# Patient Record
Sex: MALE | Race: WHITE
[De-identification: names, ages, dates, MRNs, and addresses within clinical notes are randomized per-mention and may not be internally consistent; named-entity substitution may affect disease eponyms.]

---

## 2020-01-26 ENCOUNTER — HOSPITAL ENCOUNTER (EMERGENCY)
Dept: HOSPITAL 56 - MW.ED | Age: 40
Discharge: HOME | End: 2020-01-26
Payer: SELF-PAY

## 2020-01-26 DIAGNOSIS — F17.210: ICD-10-CM

## 2020-01-26 DIAGNOSIS — R19.5: Primary | ICD-10-CM

## 2020-01-26 DIAGNOSIS — Z91.040: ICD-10-CM

## 2020-01-26 LAB
BUN SERPL-MCNC: 10 MG/DL (ref 7–18)
CHLORIDE SERPL-SCNC: 104 MMOL/L (ref 98–107)
CO2 SERPL-SCNC: 26 MMOL/L (ref 21–32)
GLUCOSE SERPL-MCNC: 88 MG/DL (ref 74–106)
LIPASE SERPL-CCNC: 95 U/L (ref 73–393)
POTASSIUM SERPL-SCNC: 3.8 MMOL/L (ref 3.5–5.1)
SODIUM SERPL-SCNC: 139 MMOL/L (ref 136–148)

## 2020-01-26 NOTE — EDM.PDOC
ED HPI GENERAL MEDICAL PROBLEM





- General


Chief Complaint: Gastrointestinal Problem


Stated Complaint: STOMACK PAIN


Time Seen by Provider: 01/26/20 17:57


Source of Information: Reports: Patient


History Limitations: Reports: No Limitations





- History of Present Illness


INITIAL COMMENTS - FREE TEXT/NARRATIVE: 


HISTORY AND PHYSICAL:





History of present illness:


Patient is a 39-year-old male who presents to the ED today with concern of 

blood in his stools over the past 2 weeks and abdominal pain that is been off 

and on.  Patient states he is not currently having abdominal pain but is 

concerned because he has been having blood in his stools.  Patient states the 

blood is darker in color.  Patient states he had blood in his stool today so he 

came to be evaluated in the ED.  Patient denies any health history or any other 

symptoms or concerns.





Patient denies fever, chills, chest pain, shortness of breath, or cough. Denies 

headache, neck stiff ness, change in vision, syncope, or near syncope. Denies 

nausea, vomiting, abdominal pain, diarrhea, constipation, or dysuria. Has not 

noted any blood in urine. Patient has been eating and drinking appropriately.





Review of systems: 


As per history of present illness and below otherwise all systems reviewed and 

negative.





Past medical history: 


As per history of present illness and as reviewed below otherwise 

noncontributory.





Surgical history: 


As per history of present illness and as reviewed below otherwise 

noncontributory.





Social history: 


See social history for further information





Family history: 


As per history of present illness and as reviewed below otherwise 

noncontributory.





Physical exam:


General: Patient is alert, oriented, and in no acute distress. Patient sitting 

comfortably on exam table.


HEENT: Atraumatic, normocephalic, pupils equal and reactive bilaterally, 

negative for conjunctival pallor or scleral icterus, mucous membranes moist, 

TMs normal bilaterally, throat clear, neck supple, nontender, trachea midline. 

No drooling or trismus noted. No meningeal signs. No hot potato voice noted. 


Lungs: Clear to auscultation, breath sounds equal bilaterally, chest nontender.


Heart: S1S2, regular rate and rhythm without overt murmur


Abdomen: Soft, nondistended, nontender. Negative for masses or 

hepatosplenomegaly. Negative for costovertebral tenderness.


Pelvis: Stable nontender.


Genitourinary: Deferred.


Rectal: Chaperone at bedside KF. Tone intact. No hemorrhoids, rashes, lesions 

noted. Hemoccult negative


Skin: Intact, warm, dry. No lesions or rashes noted.


Extremities: Atraumatic, negative for cords or calf pain. Neurovascular 

unremarkable.


Neuro: Awake, alert, oriented. Cranial nerves II through XII unremarkable. 

Cerebellum unremarkable. Motor and sensory unremarkable throughout. Exam 

nonfocal.





Notes:


Hemeoccult negative.  Discussed importance for follow-up with a general 

surgeon. Patient placed on follow up list with General surgery.


Voices understanding and is agreeable to plan of care. Denies any further 

questions or concerns at this time.





Diagnostics:


Hemeoccult. CBC, CMP, UA, lipase





Therapeutics:


None





Prescription:


Omeprazole





Impression: 


H/O  blood in stool





Plan:


1.  Take medication as prescribed.


2.  Follow-up with the general surgeon as well as a primary care provider as 

discussed.  The number has been provided above for you to call and establish an 

appointment time.


3.  Return to the ED as needed and as discussed.





Definitive disposition and diagnosis as appropriate pending reevaluation and 

review of above.





  ** Abdomen


Pain Score (Numeric/FACES): 8





- Related Data


 Allergies











Allergy/AdvReac Type Severity Reaction Status Date / Time


 


latex Allergy  Redness Verified 01/26/20 17:25











Home Meds: 


 Home Meds





. [No Known Home Meds]  01/26/20 [History]











Past Medical History





- Infectious Disease History


Infectious Disease History: Reports: Chicken Pox





- Past Surgical History


HEENT Surgical History: Reports: Myringotomy w Tube(s)


GI Surgical History: Reports: Colonoscopy





Social & Family History





- Family History


Family Medical History: Noncontributory





- Tobacco Use


Smoking Status *Q: Current Every Day Smoker


Years of Tobacco use: 15


Packs/Tins Daily: 0.5





- Caffeine Use


Caffeine Use: Reports: Coffee, Soda





- Recreational Drug Use


Recreational Drug Use: No





ED ROS GENERAL





- Review of Systems


Review Of Systems: Comprehensive ROS is negative, except as noted in HPI.





ED EXAM, GENERAL





- Physical Exam


Exam: See Below (see dictation)





Course





- Vital Signs


Last Recorded V/S: 


 Last Vital Signs











Temp  98.1 F   01/26/20 17:25


 


Pulse  92   01/26/20 17:25


 


Resp  18   01/26/20 17:25


 


BP  139/85   01/26/20 17:25


 


Pulse Ox  93 L  01/26/20 17:25














- Orders/Labs/Meds


Orders: 


 Active Orders 24 hr











 Category Date Time Status


 


 Hemoccult [Fecal Occult Blood Collection] [RC] Care  01/26/20 17:58 Active





 ASDIRECTED   











Labs: 


 Laboratory Tests











  01/26/20 01/26/20 01/26/20 Range/Units





  18:15 18:15 18:21 


 


WBC  9.00    (4.0-11.0)  K/uL


 


RBC  5.39    (4.50-5.90)  M/uL


 


Hgb  14.9    (13.0-17.0)  g/dL


 


Hct  44.2    (38.0-50.0)  %


 


MCV  82.0    (80.0-98.0)  fL


 


MCH  27.6    (27.0-32.0)  pg


 


MCHC  33.7    (31.0-37.0)  g/dL


 


RDW Std Deviation  42.2    (28.0-62.0)  fl


 


RDW Coeff of Beth  14    (11.0-15.0)  %


 


Plt Count  249    (150-400)  K/uL


 


MPV  8.60    (7.40-12.00)  fL


 


Neut % (Auto)  58.2    (48.0-80.0)  %


 


Lymph % (Auto)  28.9    (16.0-40.0)  %


 


Mono % (Auto)  10.6    (0.0-15.0)  %


 


Eos % (Auto)  2.1    (0.0-7.0)  %


 


Baso % (Auto)  0.2    (0.0-1.5)  %


 


Neut # (Auto)  5.2    (1.4-5.7)  K/uL


 


Lymph # (Auto)  2.6 H    (0.6-2.4)  K/uL


 


Mono # (Auto)  1.0 H    (0.0-0.8)  K/uL


 


Eos # (Auto)  0.2    (0.0-0.7)  K/uL


 


Baso # (Auto)  0.0    (0.0-0.1)  K/uL


 


Nucleated RBC %  0.0    /100WBC


 


Nucleated RBCs #  0    K/uL


 


Sodium   139   (136-148)  mmol/L


 


Potassium   3.8   (3.5-5.1)  mmol/L


 


Chloride   104   ()  mmol/L


 


Carbon Dioxide   26.0   (21.0-32.0)  mmol/L


 


BUN   10   (7.0-18.0)  mg/dL


 


Creatinine   1.1   (0.8-1.3)  mg/dL


 


Est Cr Clr Drug Dosing   103.91   mL/min


 


Estimated GFR (MDRD)   > 60.0   ml/min


 


Glucose   88   ()  mg/dL


 


Calcium   8.6   (8.5-10.1)  mg/dL


 


Total Bilirubin   0.4   (0.2-1.0)  mg/dL


 


AST   14 L   (15-37)  IU/L


 


ALT   25   (14-63)  IU/L


 


Alkaline Phosphatase   23 L   ()  U/L


 


Total Protein   7.2   (6.4-8.2)  g/dL


 


Albumin   3.5   (3.4-5.0)  g/dL


 


Globulin   3.7   (2.6-4.0)  g/dL


 


Albumin/Globulin Ratio   0.9   (0.9-1.6)  


 


Lipase   95   ()  U/L


 


Urine Color    YELLOW  


 


Urine Appearance    CLEAR  


 


Urine pH    6.0  (5.0-8.0)  


 


Ur Specific Gravity    1.015  (1.001-1.035)  


 


Urine Protein    NEGATIVE  (NEGATIVE)  mg/dL


 


Urine Glucose (UA)    NEGATIVE  (NEGATIVE)  mg/dL


 


Urine Ketones    NEGATIVE  (NEGATIVE)  mg/dL


 


Urine Occult Blood    NEGATIVE  (NEGATIVE)  


 


Urine Nitrite    NEGATIVE  (NEGATIVE)  


 


Urine Bilirubin    NEGATIVE  (NEGATIVE)  


 


Urine Urobilinogen    0.2  (<2.0)  EU/dL


 


Ur Leukocyte Esterase    NEGATIVE  (NEGATIVE)  














Departure





- Departure


Time of Disposition: 19:00


Disposition: Home, Self-Care 01


Clinical Impression: 


 History of bloody stools








- Discharge Information


Referrals: 


PCP,None [Primary Care Provider] - 


Forms:  ED Department Discharge


Additional Instructions: 


The following information is given to patients seen in the emergency department 

who are being discharged to home. This information is to outline your options 

for follow-up care. We provide all patients seen in our emergency department 

with a follow-up referral.





The need for follow-up, as well as the timing and circumstances, are variable 

depending upon the specifics of your emergency department visit.





If you don't have a primary care physician on staff, we will provide you with a 

referral. We always advise you to contact your personal physician following an 

emergency department visit to inform them of the circumstance of the visit and 

for follow-up with them and/or the need for any referrals to a consulting 

specialist.





The emergency department will also refer you to a specialist when appropriate. 

This referral assures that you have the opportunity for follow-up care with a 

specialist. All of these measure are taken in an effort to provide you with 

optimal care, which includes your follow-up.





Under all circumstances we always encourage you to contact your private 

physician who remains a resource for coordinating your care. When calling for 

follow-up care, please make the office aware that this follow-up is from your 

recent emergency room visit. If for any reason you are refused follow-up, 

please contact the [facility] Emergency 

Department at [phone] and asked to speak to the emergency department 

charge nurse.





[facility]


Primary Care


[address] [place] [address]


Phone: [phone]


Fax: [phone]





[address] [address]


Phone: [phone]


Fax: [phone]





[facility] - General Surgery


[address]


[address] [place] [address], [address] 


[place] [address]


Phone: [phone]


Fax: [phone]








1.  Take medication as prescribed.


2.  Follow-up with the general surgeon as well as a primary care provider as 

discussed.  The number has been provided above for you to call and establish an 

appointment time.


3.  Return to the ED as needed and as discussed.





 











Sepsis Event Note





- Evaluation


Sepsis Screening Result: No Definite Risk





- Focused Exam


Vital Signs: 


 Vital Signs











  Temp Pulse Resp BP Pulse Ox


 


 01/26/20 17:25  98.1 F  92  18  139/85  93 L











Date Exam was Performed: 01/26/20


Time Exam was Performed: 18:57





- My Orders


Last 24 Hours: 


My Active Orders





01/26/20 17:58


Hemoccult [Fecal Occult Blood Collection] [] ASDIRECTED 














- Assessment/Plan


Last 24 Hours: 


My Active Orders





01/26/20 17:58


Hemoccult [Fecal Occult Blood Collection] [RC] ASDIRECTED

## 2020-02-19 ENCOUNTER — HOSPITAL ENCOUNTER (EMERGENCY)
Dept: HOSPITAL 56 - MW.ED | Age: 40
Discharge: HOME | End: 2020-02-19
Payer: SELF-PAY

## 2020-02-19 DIAGNOSIS — J40: Primary | ICD-10-CM

## 2020-02-19 DIAGNOSIS — F17.210: ICD-10-CM

## 2020-02-19 DIAGNOSIS — Z91.040: ICD-10-CM

## 2020-02-19 LAB
BUN SERPL-MCNC: 9 MG/DL (ref 7–18)
CHLORIDE SERPL-SCNC: 102 MMOL/L (ref 98–107)
CO2 SERPL-SCNC: 26.7 MMOL/L (ref 21–32)
GLUCOSE SERPL-MCNC: 93 MG/DL (ref 74–106)
POTASSIUM SERPL-SCNC: 3.7 MMOL/L (ref 3.5–5.1)
SODIUM SERPL-SCNC: 138 MMOL/L (ref 136–148)

## 2020-02-19 PROCEDURE — 84484 ASSAY OF TROPONIN QUANT: CPT

## 2020-02-19 PROCEDURE — 93005 ELECTROCARDIOGRAM TRACING: CPT

## 2020-02-19 PROCEDURE — 85379 FIBRIN DEGRADATION QUANT: CPT

## 2020-02-19 PROCEDURE — 80053 COMPREHEN METABOLIC PANEL: CPT

## 2020-02-19 PROCEDURE — 85025 COMPLETE CBC W/AUTO DIFF WBC: CPT

## 2020-02-19 PROCEDURE — 71046 X-RAY EXAM CHEST 2 VIEWS: CPT

## 2020-02-19 PROCEDURE — 99285 EMERGENCY DEPT VISIT HI MDM: CPT

## 2020-02-19 PROCEDURE — 36415 COLL VENOUS BLD VENIPUNCTURE: CPT

## 2020-02-19 NOTE — EDM.PDOC
ED [facility] GENERAL MEDICAL PROBLEM





- General


Chief Complaint: General


Stated Complaint: TROUBLE BREATHING, DIZZY


Time Seen by Provider: 02/19/20 08:56


Source of Information: Reports: Patient


History Limitations: Reports: No Limitations





- History of Present Illness


INITIAL COMMENTS - FREE TEXT/NARRATIVE: 


Patient is a 39-year-old male with no known past medical history presenting 

with a chief complaint of shortness of breath.  Patient states that shortness 

of breath started last night where he felt like he could not catch her breath 

regardless of his position.  Onset was at rest.  Patient reports some mild 

chest tightness but no chest pain.  Patient states the symptoms persisted all 

night and continued this morning.  Patient denies any progression of the 

symptoms.  Patient reports mild cough which is nonproductive.  Patient denies 

fevers, chills, sore throat, nasal congestion.  Patient has no prior history of 

DVT or PE.  Patient has not noticed any lower extremity swelling or calf pain.





Pmhx: Obesity


Pshx: None


Family Hx: noncontributory 


Occupation history: Works as a 





Smoking history?  Yes


Etoh use? none


Drug use? none





In addition to that documented in the HPI above, the additional ROS was obtained

:


Constitutional: Denies fevers or chills


Eyes: Denies vision changes


ENMT: Denies sore throat


CV: Denies chest pain


Resp: Per HPI


GI: Denies vomiting or diarrhea


: Denies painful urination


MSK: Denies recent trauma


Skin: Denies new rashes


Neuro: Denies new numbness or tingling or weakness


Endocrine: Denies unexpected weight loss


Heme: Denies bleeding disorders





I have reviewed the triage vital signs


Const: Well nourished, well developed, appears stated age


Eyes: PERRL, no conjunctival injection


HENT: NCAT, Neck supple without meningismus 


CV: RRR, Warm, well-perfused extremities


RESP: CTAB, Unlabored respiratory effort


GI: soft, non-tender, non-distended, no masses


MSK: No gross deformities appreciated


Skin: Warm, dry. No rashes


Neuro: Alert, CNs II-XII grossly intact. Sensation and motor function of 

extremities grossly intact.


Psych: Appropriate mood and affect





Assessment and plan:


Patient is a 39-year-old male presenting with shortness of breath.  Patient had 

labs done which did not demonstrate any elevated troponin or EKG changes.  

Patient has normal chest x-ray.  Patient is comfortable on room air.  Patient 

will be discharged home with diagnosis of bronchitis.  Patient urged to quit 

smoking to help with symptom alleviation.  Patient given strict return 

precautions.  PE was considered however with negative d-dimer we will not 

proceed with further work-up.  As patient is low risk.











- Related Data


 Allergies











Allergy/AdvReac Type Severity Reaction Status Date / Time


 


latex Allergy  Redness Verified 02/19/20 08:21














Past Medical History





- Infectious Disease History


Infectious Disease History: Reports: Chicken Pox





- Past Surgical History


HEENT Surgical History: Reports: Myringotomy w Tube(s)


GI Surgical History: Reports: Colonoscopy


Other GI Surgeries/Procedures: Hx Bloody Stools





Social & Family History





- Family History


Family Medical History: Noncontributory





- Tobacco Use


Smoking Status *Q: Current Every Day Smoker


Years of Tobacco use: 26


Packs/Tins Daily: 0.5





- Caffeine Use


Caffeine Use: Reports: Coffee, Soda





- Recreational Drug Use


Recreational Drug Use: No





ED ROS GENERAL





- Review of Systems


Review Of Systems: See Below





ED EXAM, GENERAL





- Physical Exam


Exam: See Below





Course





- Vital Signs


Last Recorded V/S: 


 Last Vital Signs











Temp  37.6 C   02/19/20 08:16


 


Pulse  107 H  02/19/20 08:16


 


Resp  16   02/19/20 08:16


 


BP  137/87   02/19/20 08:16


 


Pulse Ox  97   02/19/20 08:16














- Orders/Labs/Meds


Orders: 


 Active Orders 24 hr











 Category Date Time Status


 


 EKG Documentation Completion [RC] STAT Care  02/19/20 08:37 Active


 


 EKG Documentation Completion [RC] STAT Care  02/19/20 11:01 Active











Labs: 


 Laboratory Tests











  02/19/20 02/19/20 02/19/20 Range/Units





  08:51 08:51 08:51 


 


WBC  6.08    (4.0-11.0)  K/uL


 


RBC  5.38    (4.50-5.90)  M/uL


 


Hgb  14.5    (13.0-17.0)  g/dL


 


Hct  43.7    (38.0-50.0)  %


 


MCV  81.2    (80.0-98.0)  fL


 


MCH  27.0    (27.0-32.0)  pg


 


MCHC  33.2    (31.0-37.0)  g/dL


 


RDW Std Deviation  42.3    (28.0-62.0)  fl


 


RDW Coeff of Beth  15    (11.0-15.0)  %


 


Plt Count  211    (150-400)  K/uL


 


MPV  8.90    (7.40-12.00)  fL


 


Neut % (Auto)  75.8    (48.0-80.0)  %


 


Lymph % (Auto)  7.1 L    (16.0-40.0)  %


 


Mono % (Auto)  15.6 H    (0.0-15.0)  %


 


Eos % (Auto)  1.3    (0.0-7.0)  %


 


Baso % (Auto)  0.2    (0.0-1.5)  %


 


Neut # (Auto)  4.6    (1.4-5.7)  K/uL


 


Lymph # (Auto)  0.4 L    (0.6-2.4)  K/uL


 


Mono # (Auto)  1.0 H    (0.0-0.8)  K/uL


 


Eos # (Auto)  0.1    (0.0-0.7)  K/uL


 


Baso # (Auto)  0.0    (0.0-0.1)  K/uL


 


Nucleated RBC %  0.0    /100WBC


 


Nucleated RBCs #  0    K/uL


 


D-Dimer, Quantitative   0.41   (0.0-0.50)  mg/L FEU


 


Sodium    138  (136-148)  mmol/L


 


Potassium    3.7  (3.5-5.1)  mmol/L


 


Chloride    102  ()  mmol/L


 


Carbon Dioxide    26.7  (21.0-32.0)  mmol/L


 


BUN    9  (7.0-18.0)  mg/dL


 


Creatinine    1.1  (0.8-1.3)  mg/dL


 


Est Cr Clr Drug Dosing    103.91  mL/min


 


Estimated GFR (MDRD)    > 60.0  ml/min


 


Glucose    93  ()  mg/dL


 


Calcium    8.5  (8.5-10.1)  mg/dL


 


Total Bilirubin    0.4  (0.2-1.0)  mg/dL


 


AST    13 L  (15-37)  IU/L


 


ALT    26  (14-63)  IU/L


 


Alkaline Phosphatase    23 L  ()  U/L


 


Troponin I    < 0.050  (0.000-0.056)  ng/mL


 


Total Protein    7.1  (6.4-8.2)  g/dL


 


Albumin    3.4  (3.4-5.0)  g/dL


 


Globulin    3.7  (2.6-4.0)  g/dL


 


Albumin/Globulin Ratio    0.9  (0.9-1.6)  














  02/19/20 Range/Units





  11:12 


 


WBC   (4.0-11.0)  K/uL


 


RBC   (4.50-5.90)  M/uL


 


Hgb   (13.0-17.0)  g/dL


 


Hct   (38.0-50.0)  %


 


MCV   (80.0-98.0)  fL


 


MCH   (27.0-32.0)  pg


 


MCHC   (31.0-37.0)  g/dL


 


RDW Std Deviation   (28.0-62.0)  fl


 


RDW Coeff of Beth   (11.0-15.0)  %


 


Plt Count   (150-400)  K/uL


 


MPV   (7.40-12.00)  fL


 


Neut % (Auto)   (48.0-80.0)  %


 


Lymph % (Auto)   (16.0-40.0)  %


 


Mono % (Auto)   (0.0-15.0)  %


 


Eos % (Auto)   (0.0-7.0)  %


 


Baso % (Auto)   (0.0-1.5)  %


 


Neut # (Auto)   (1.4-5.7)  K/uL


 


Lymph # (Auto)   (0.6-2.4)  K/uL


 


Mono # (Auto)   (0.0-0.8)  K/uL


 


Eos # (Auto)   (0.0-0.7)  K/uL


 


Baso # (Auto)   (0.0-0.1)  K/uL


 


Nucleated RBC %   /100WBC


 


Nucleated RBCs #   K/uL


 


D-Dimer, Quantitative   (0.0-0.50)  mg/L FEU


 


Sodium   (136-148)  mmol/L


 


Potassium   (3.5-5.1)  mmol/L


 


Chloride   ()  mmol/L


 


Carbon Dioxide   (21.0-32.0)  mmol/L


 


BUN   (7.0-18.0)  mg/dL


 


Creatinine   (0.8-1.3)  mg/dL


 


Est Cr Clr Drug Dosing   mL/min


 


Estimated GFR (MDRD)   ml/min


 


Glucose   ()  mg/dL


 


Calcium   (8.5-10.1)  mg/dL


 


Total Bilirubin   (0.2-1.0)  mg/dL


 


AST   (15-37)  IU/L


 


ALT   (14-63)  IU/L


 


Alkaline Phosphatase   ()  U/L


 


Troponin I  < 0.050  (0.000-0.056)  ng/mL


 


Total Protein   (6.4-8.2)  g/dL


 


Albumin   (3.4-5.0)  g/dL


 


Globulin   (2.6-4.0)  g/dL


 


Albumin/Globulin Ratio   (0.9-1.6)  














Departure





- Departure


Time of Disposition: 11:58


Disposition: Home, Self-Care 01


Clinical Impression: 


 Bronchitis








- Discharge Information


Referrals: 


PCP,None [Primary Care Provider] - 


Forms:  ED Department Discharge





Sepsis Event Note





- Evaluation


Sepsis Screening Result: No Definite Risk





- Focused Exam


Vital Signs: 


 Vital Signs











  Temp Pulse Resp BP Pulse Ox


 


 02/19/20 08:16  37.6 C  107 H  16  137/87  97











Date Exam was Performed: 02/19/20


Time Exam was Performed: 11:58





- My Orders


Last 24 Hours: 


My Active Orders





02/19/20 08:37


EKG Documentation Completion [RC] STAT 





02/19/20 11:01


EKG Documentation Completion [RC] STAT 














- Assessment/Plan


Last 24 Hours: 


My Active Orders





02/19/20 08:37


EKG Documentation Completion [RC] STAT 





02/19/20 11:01


EKG Documentation Completion [RC] STAT
